# Patient Record
Sex: FEMALE | Race: WHITE | Employment: FULL TIME | URBAN - METROPOLITAN AREA
[De-identification: names, ages, dates, MRNs, and addresses within clinical notes are randomized per-mention and may not be internally consistent; named-entity substitution may affect disease eponyms.]

---

## 2017-03-24 ENCOUNTER — TELEPHONE (OUTPATIENT)
Dept: FAMILY MEDICINE | Facility: CLINIC | Age: 27
End: 2017-03-24

## 2017-03-24 NOTE — LETTER
[facility]  [address] [facility] [address]  [phone]          March 24, 2017    Marilu Gatica  [address] [facility] [address]    Marilu,      Our records indicate that you have not scheduled for a(n)annual female exam which was recommended by your health care team.     If you are receiving any of these services at another site please bring in a copy at your next visit so we can get it scanned into your chart.     Monitoring and managing your preventative and chronic health conditions are very important to us.     If you have received your health care elsewhere, please call the clinic so the information can be documented in your chart.    Please call [phone] or message us through your [place] account to schedule an appointment or provide information for your chart.     I look forward to seeing you and working with you on your health care needs.     Sincerely,       Your [facility]/rb              *If you have already scheduled an appointment, please disregard this reminder

## 2017-03-24 NOTE — TELEPHONE ENCOUNTER
Panel Management Review      Patient has the following on her problem list:     Depression / Dysthymia review  PHQ-9 SCORE 6/24/2016 8/4/2016 12/28/2016   Total Score - - -   Total Score 13 5 4      Patient is due for:  None      Composite cancer screening  Chart review shows that this patient is due/due soon for the following Pap Smear  Summary:    Patient is due/failing the following:   PAP    Action needed:   Patient needs office visit for physical.    Type of outreach:    Sent letter.    Questions for provider review:    None                                                                                                                                    Krista Jennings CMA       Chart routed to closed .

## 2019-02-28 ENCOUNTER — NURSE TRIAGE (OUTPATIENT)
Dept: NURSING | Facility: CLINIC | Age: 29
End: 2019-02-28

## 2019-02-28 NOTE — TELEPHONE ENCOUNTER
Patient reports shoveling driveway yesterday and fell on tailbone.  Patient is doing ice and heat and ibuprofen but says pain is worse today.  Pain level is 6/10 but can goe up to 7-8/10 when getting up after sitting for a while.  Reviewed guideline and care advice with caller to treat at home and call back with worsening symptoms.  Caller verbalizes understanding.        Additional Information    Negative: Dangerous mechanism of injury (e.g., fall > 10 feet or 3 meters, trampoline)(Exception: pain began > 1 hour after injury)    Negative: Sounds like a life-threatening emergency to the triager    Negative: Injury to back above tailbone    Negative: Wound looks infected    Negative: Can't walk or very difficult to walk    Negative: [1] Unable to urinate (or only a few drops) > 4 hours AND     [2] bladder feels very full (e.g., palpable bladder or strong urge to urinate)    Negative: [1] Urinary incontinence (i.e., loss of bladder control) AND [2] new onset    Negative: Numbness (loss of sensation) in groin or rectal area    Negative: Blood in stool    Negative: Blood in urine (red, pink, or tea-colored)    Negative: Weakness of a leg or foot (e.g., unable to bear weight, dragging foot)    Negative: Numbness in a leg or foot (i.e., loss of sensation)    Negative: [1] SEVERE pain AND [2] not improved 2 hours after pain medicine/ice packs    Negative: Pain radiates into the thigh or further down the leg now    Negative: [1] High-risk adult (e.g., age > 60, osteoporosis, chronic steroid use) AND [2] still hurts    Negative: [1] After 3 days (72 hours) AND [2] pain not improving    Negative: [1] After 4 weeks AND [2] still painful    Minor tailbone injury    Protocols used: TAILBONE INJURY-ADULT-

## 2019-09-29 ENCOUNTER — OFFICE VISIT (OUTPATIENT)
Dept: URGENT CARE | Facility: URGENT CARE | Age: 29
End: 2019-09-29
Payer: COMMERCIAL

## 2019-09-29 VITALS
TEMPERATURE: 98.4 F | BODY MASS INDEX: 44.69 KG/M2 | DIASTOLIC BLOOD PRESSURE: 87 MMHG | HEIGHT: 63 IN | OXYGEN SATURATION: 96 % | WEIGHT: 252.2 LBS | SYSTOLIC BLOOD PRESSURE: 143 MMHG | HEART RATE: 92 BPM

## 2019-09-29 DIAGNOSIS — R03.0 ELEVATED BP WITHOUT DIAGNOSIS OF HYPERTENSION: ICD-10-CM

## 2019-09-29 DIAGNOSIS — M54.50 ACUTE RIGHT-SIDED LOW BACK PAIN WITHOUT SCIATICA: Primary | ICD-10-CM

## 2019-09-29 PROCEDURE — 99204 OFFICE O/P NEW MOD 45 MIN: CPT | Performed by: INTERNAL MEDICINE

## 2019-09-29 RX ORDER — IBUPROFEN 800 MG/1
800 TABLET, FILM COATED ORAL EVERY 8 HOURS PRN
Qty: 50 TABLET | Refills: 0 | Status: SHIPPED | OUTPATIENT
Start: 2019-09-29 | End: 2020-02-18 | Stop reason: ALTCHOICE

## 2019-09-29 RX ORDER — CYCLOBENZAPRINE HCL 10 MG
10 TABLET ORAL 3 TIMES DAILY PRN
Qty: 30 TABLET | Refills: 0 | Status: SHIPPED | OUTPATIENT
Start: 2019-09-29 | End: 2020-02-18

## 2019-09-29 ASSESSMENT — MIFFLIN-ST. JEOR: SCORE: 1828.1

## 2019-09-29 NOTE — PROGRESS NOTES
SUBJECTIVE:  Marilu Villeda is an 29 year old female who presents for back pain.  Having pain in right low back.  Some radiation of pain into upper thigh.  Hurts more when goes between sitting and standing.  Has taken ibuprofen and used topical biofreeze which help a little.  sxs started two days ago.  Hasn't improved since onset, and has worsened some.  In past has had a couple falls which caused back issues for a short time, but overall no back issues.  No recent falls or trauma or accidents.  Normal urination.  No incontinence.  Normal BMs with no incontinence.  Feels most comfortable when lies down, but hurts if rolls over in bed.  Sleeping some but wakes up some from pain when moves in sleep.  Helps to have a pillow between legs.  No numbness or weakness.  No fevers, chills, sweats.    PMH:  Patient Active Problem List   Diagnosis     CARDIOVASCULAR SCREENING; LDL GOAL LESS THAN 160     Obesity, Class II, BMI 35-39.9     Depression with anxiety     Anxiety     Social History     Socioeconomic History     Marital status:      Spouse name: None     Number of children: None     Years of education: None     Highest education level: None   Occupational History     None   Social Needs     Financial resource strain: None     Food insecurity:     Worry: None     Inability: None     Transportation needs:     Medical: None     Non-medical: None   Tobacco Use     Smoking status: Never Smoker     Smokeless tobacco: Never Used   Substance and Sexual Activity     Alcohol use: Yes     Drug use: No     Sexual activity: Yes     Partners: Male   Lifestyle     Physical activity:     Days per week: None     Minutes per session: None     Stress: None   Relationships     Social connections:     Talks on phone: None     Gets together: None     Attends Congregation service: None     Active member of club or organization: None     Attends meetings of clubs or organizations: None     Relationship status: None     Intimate  "partner violence:     Fear of current or ex partner: None     Emotionally abused: None     Physically abused: None     Forced sexual activity: None   Other Topics Concern     Parent/sibling w/ CABG, MI or angioplasty before 65F 55M? No   Social History Narrative     None     FH: negative for bone diseases    ALLERGIES:  Patient has no known allergies.    Current Outpatient Medications   Medication     Ibuprofen (IBU PO)     No current facility-administered medications for this visit.          ROS:  ROS is done and is negative for general/constitutional, eye, ENT, Respiratory, cardiovascular, GI, , Skin, musculoskeletal except as noted elsewhere.  All other review of systems negative except as noted elsewhere.      OBJECTIVE:  BP (!) 143/87   Pulse 92   Temp 98.4  F (36.9  C) (Oral)   Ht 1.6 m (5' 3\")   Wt 114.4 kg (252 lb 3.2 oz)   SpO2 96%   BMI 44.68 kg/m    GENERAL APPEARANCE: Alert, in no acute distress  EYES: normal  NOSE:normal  OROPHARYNX:normal  NECK:No adenopathy,masses or thyromegaly  RESP: normal and clear to auscultation  CV:regular rate and rhythm and no murmurs, clicks, or gallops  ABDOMEN: Abdomen soft, non-tender. BS normal. No masses, organomegaly  SKIN: no ulcers, lesions or rash  BACK: mild diffuse tenderness of right lower back, no edema, no erythema, no bruising.  Mildly limited rom in all directions due to pain.  slr negative bilaterally.   NEURO: Strength 5/5 and symmetric in bilateral lower extremities.  DTRs 2+ and symmetric in bilateral lower extremities.  Sensation to light touch grossly intact in bilateral lower extremities.    RESULTS  .  No results found for this or any previous visit (from the past 48 hour(s)).    ASSESSMENT/PLAN:    ASSESSMENT / PLAN:  (M54.5) Acute right-sided low back pain without sciatica  (primary encounter diagnosis)  Comment: currently most c/w muscular etiology.  Currently not c/w vertebral fracture, vertebral abscess, cauda equina syndrome, or " significant neurologic involvement  Plan: cyclobenzaprine (FLEXERIL) 10 MG tablet,         ibuprofen (ADVIL/MOTRIN) 800 MG tablet, [facility],        HAND, AND CHIROPRACTIC REFERRAL        Reviewed medication instructions and side effects. Follow up if experiences side effects.. Advised that flexeril can make drowsy or altered and is not to drive, operate machinery or consume alcohol or street drugs when taking.  Referred to PT and if not improving over the next couple days, is to schedule with PT.   I reviewed supportive care including icing, rest of the area, and nsaids,  otc meds to use if needed, expected course, and signs of concern.  Follow up as needed or if she does not improve within 10 day(s) or if worsens in any way.  Reviewed red flag symptoms and is to go to the ER if experiences any of these.      (R03.0) Elevated BP without diagnosis of hypertension  Comment: bp a little elevated which is likely due to pain  Plan: Recheck BP in 1-2 weeks with a nurse visit, [place] pharmacy visit, or a visit to primary care doctor.    See [facility] for orders, medications, letters, patient instructions    Svetlana Rodarte M.D.

## 2019-09-29 NOTE — PATIENT INSTRUCTIONS
Schedule an appointment with physical therapy if you do not improve over the next two days.    Recheck BP in 1-2 weeks with a nurse visit, [place] pharmacy visit, or a visit to primary care doctor.

## 2020-02-18 ENCOUNTER — OFFICE VISIT (OUTPATIENT)
Dept: FAMILY MEDICINE | Facility: CLINIC | Age: 30
End: 2020-02-18
Payer: COMMERCIAL

## 2020-02-18 VITALS
DIASTOLIC BLOOD PRESSURE: 94 MMHG | HEART RATE: 83 BPM | HEIGHT: 63 IN | WEIGHT: 260 LBS | SYSTOLIC BLOOD PRESSURE: 137 MMHG | TEMPERATURE: 98.6 F | BODY MASS INDEX: 46.07 KG/M2 | OXYGEN SATURATION: 96 %

## 2020-02-18 DIAGNOSIS — M54.41 ACUTE RIGHT-SIDED LOW BACK PAIN WITH RIGHT-SIDED SCIATICA: Primary | ICD-10-CM

## 2020-02-18 PROCEDURE — 99214 OFFICE O/P EST MOD 30 MIN: CPT | Performed by: FAMILY MEDICINE

## 2020-02-18 RX ORDER — SULINDAC 200 MG/1
200 TABLET ORAL 2 TIMES DAILY
Qty: 60 TABLET | Refills: 1 | Status: SHIPPED | OUTPATIENT
Start: 2020-02-18 | End: 2020-04-23

## 2020-02-18 RX ORDER — CYCLOBENZAPRINE HCL 10 MG
5-10 TABLET ORAL 3 TIMES DAILY PRN
Qty: 40 TABLET | Refills: 0 | Status: SHIPPED | OUTPATIENT
Start: 2020-02-18

## 2020-02-18 RX ORDER — GABAPENTIN 300 MG/1
CAPSULE ORAL
Qty: 90 CAPSULE | Refills: 1 | Status: SHIPPED | OUTPATIENT
Start: 2020-02-18 | End: 2020-04-23

## 2020-02-18 ASSESSMENT — PAIN SCALES - GENERAL: PAINLEVEL: MODERATE PAIN (5)

## 2020-02-18 ASSESSMENT — MIFFLIN-ST. JEOR: SCORE: 1863.48

## 2020-02-18 NOTE — PROGRESS NOTES
"Subjective     Marilu Villeda is a 29 year old female who presents to clinic today for the following health issues:    HPI   Back Pain       Duration: 2/13        Specific cause: lifting    Description:   Location of pain: low back right  Character of pain: sharp  Pain radiation: radiates into the right buttocks and radiates into the left buttocks (tingles, no pain)  New numbness or weakness in legs, not attributed to pain:  no     Intensity: Currently 5/10, At its worst 10/10    Progression of symptoms: getting better, worse in the morning    History:   Pain interferes with job: YES  History of back problems: YES - see note from 9/29/19  Any previous MRI or X-rays: None  Sees a specialist for back pain:  No  Therapies tried without relief: Icy hot & IBU (500 mg, 4-5 times per day)    Alleviating factors:   Improved by: none      Precipitating factors:  Worsened by: Sitting & laying on side        Accompanying Signs & Symptoms:  Risk of Fracture:  None  Risk of Cauda Equina:  None  Risk of Infection:  None  Risk of Cancer:  None  Risk of Ankylosing Spondylitis:  Onset at age <35, male, AND morning back stiffness. no       She has been coughing nonstop because she was sick. She could feel her back getting sore because of her coughing. She bent down to  a laundry basket, and she felt \"like a spark of electricity almost.\" She took 2/14 off of work because she couldn't sit properly, and laying on her back was the only comfortable position.    Past medical, family, and social histories, medications, and allergies are reviewed and updated in [facility].    Review of Systems   ROS COMP: Constitutional, HEENT, cardiovascular, pulmonary, gi and gu systems are negative, except as otherwise noted.      This document serves as a record of the services and decisions personally performed and made by Dr. Jhaveri. It was created on his behalf by Whit Elliott, a trained medical scribe. The creation of this document is based " "the provider's statements to the medical scribe.  Whit Elliott,  4:46 PM     Objective    BP (!) 137/94   Pulse 83   Temp 98.6  F (37  C) (Oral)   Ht 1.6 m (5' 3\")   Wt 117.9 kg (260 lb)   SpO2 96%   BMI 46.06 kg/m    Body mass index is 46.06 kg/m .     Physical Exam   GENERAL: healthy, alert and no distress  EYES: Eyes grossly normal to inspection, PERRL, EOMI, sclerae white and conjunctivae normal  MS: no gross musculoskeletal defects noted, no edema. Tenderness in right paraspinous muscular area, approximately at the LS junction. A little bit of midline tenderness in the same area.  SKIN: no suspicious lesions or rashes to visible skin  NEURO: Normal strength and tone, sensory exam grossly normal, mentation intact, oriented times 3 and cranial nerves 2-12 intact, lower extremity DTRs symmetrical, gait normal including heel and toe walk  PSYCH: mentation appears normal, affect normal/bright        Assessment & Plan     (M54.41) Acute right-sided low back pain with right-sided sciatica  (primary encounter diagnosis)  Comment: possible lumbar radicular symptoms, but the back pain is the main issue  Plan: cyclobenzaprine 10 MG PO tablet, gabapentin         (NEURONTIN) 300 MG PO capsule, [facility] PT, HAND,         AND CHIROPRACTIC REFERRAL, sulindac 200 MG PO         tablet        Handout(s) provided. Discontinue ibuprofen (patient advised that her dose was too high anyway). Return in about 5 weeks (around 3/26/2020) for recheck if symptoms fail to resolve by then.          The information in this document, created by the medical scribe for me, accurately reflects the services I personally performed and the decisions made by me. I have reviewed and approved this document for accuracy prior to leaving the patient care area.  Edgar Jhaveri MD  "

## 2020-02-18 NOTE — PATIENT INSTRUCTIONS
At [facility], we strive to deliver an exceptional experience to you, every time we see you. If you receive a survey, please complete it as we do value your feedback.  If you have MyChart, you can expect to receive results automatically within 24 hours of their completion.  Your provider will send a note interpreting your results as well.   If you do not have MyChart, you should receive your results in about a week by mail.    Your care team:                            Family Medicine Internal Medicine   MD Homer De La Cruz MD Shantel Branch-Fleming, MD Katya Georgiev PA-C Megan Hill, APRN CNP    Lio Denney, MD Pediatrics   Handy Barnett, PAFAITH Caldwell, MD Ernestina Hoang APRN CNP   MD Arti Sales MD Deborah Mielke, MD Kim Thein, APRN CNP      Clinic hours: Monday - Thursday 7 am-7 pm; Fridays 7 am-5 pm.   Urgent care: Monday - Friday 11 am-9 pm; Saturday and Sunday 9 am-5 pm.  Pharmacy : Monday -Thursday 8 am-8 pm; Friday 8 am-6 pm; Saturday and Sunday 9 am-5 pm.     Clinic: [phone]   Pharmacy: [phone]        Patient Education     Possible Causes of Low Back or Leg Pain    BIG: The symptoms in your back or leg may be due to pressure on a nerve. This pressure may be caused by a damaged disk or by abnormal bone growth. Either way, you may feel pain, burning, tingling, or numbness. If you have pressure on a nerve that connects to the sciatic nerve, pain may shoot down your leg.    Pressure from the disk  Constant wear and tear can weaken a disk over time and cause back pain. The disk can then be damaged by a sudden movement or injury. If its soft center starts to bulge, the disk may press on a nerve. Or the outside of the disk may tear, and the soft center may squeeze through and pinch a nerve.    Pressure from bone  As a disk wears out, the vertebrae right above and below the disk start to touch. This can put  pressure on a nerve. Often, abnormal bone (called bone spurs) grows where the vertebrae rub against each other. This can cause the foramen or the spinal canal to narrow (called stenosis) and press against a nerve.  Date Last Reviewed: 3/1/2018    0791-2396 [employer]. [address] [place] [address]. All rights reserved. This information is not intended as a substitute for professional medical care. Always follow your healthcare professional's instructions.           Patient Education     Relieving Back Pain  Back pain is a common problem. You can strain back muscles by lifting too much weight or just by moving the wrong way. Back strain can be uncomfortable, even painful. And it can take weeks or months to improve. To help yourself feel better and prevent future back strains, try these tips.  Important: Don't give aspirin to children or teens without first discussing it with your child's healthcare provider.  Ice    Ice reduces muscle pain and swelling. It helps most during the first 24 to 48 hours after an injury.    Wrap an ice pack or a bag of frozen peas in a thin towel. Never put ice directly on your skin.    Place the ice where your back hurts the most.    Don t ice for more than 20 minutes at a time.    You can use ice several times a day.  Medicines  Over-the-counter pain relievers include acetaminophen and anti-inflammatory medicines, which includes aspirin, naproxen, or ibuprofen. They can help ease discomfort. Some also reduce swelling.    Tell your healthcare provider about any medicines you are already taking.    Take medicines only as directed.  Manipulation and massage  Having manipulation by an osteopathic doctor or chiropractor may be helpful. Getting a massage also may help.   Heat  After the first 48 hours, heat can relax sore muscles and improve blood flow.    Try a warm bath or shower. Or use a heating pad set on low. To prevent a burn, keep a cloth between you and the  heating pad.    Don t use a heating pad for more than 15 minutes at a time. Never sleep on a heating pad.  Date Last Reviewed: 6/1/2018 2000-2019 [employer]. [address] [place] [address], [place] [address]. All rights reserved. This information is not intended as a substitute for professional medical care. Always follow your healthcare professional's instructions.

## 2020-04-21 DIAGNOSIS — M54.41 ACUTE RIGHT-SIDED LOW BACK PAIN WITH RIGHT-SIDED SCIATICA: ICD-10-CM

## 2020-04-22 DIAGNOSIS — M54.41 ACUTE RIGHT-SIDED LOW BACK PAIN WITH RIGHT-SIDED SCIATICA: ICD-10-CM

## 2020-04-23 RX ORDER — GABAPENTIN 300 MG/1
300 CAPSULE ORAL 3 TIMES DAILY
Qty: 90 CAPSULE | Refills: 1 | Status: SHIPPED | OUTPATIENT
Start: 2020-04-23

## 2020-04-23 RX ORDER — SULINDAC 200 MG/1
200 TABLET ORAL 2 TIMES DAILY WITH MEALS
Qty: 60 TABLET | Refills: 1 | Status: SHIPPED | OUTPATIENT
Start: 2020-04-23

## 2020-04-23 NOTE — TELEPHONE ENCOUNTER
sulindac 200 MG PO tablet   Last Written Prescription Date:  2/18/20  Last Fill Quantity: 60,  # refills: 1   Last office visit: 2/18/2020 with prescribing provider:     Future Office Visit:        Routing refill request to provider for review/approval because:  Labs not current:  ALT, AST, CBC, Creatinine  BP above goal  Fails protocol    Martha Moreira RN  [facility]

## 2020-04-23 NOTE — TELEPHONE ENCOUNTER
Requested Prescriptions   Pending Prescriptions Disp Refills     gabapentin (NEURONTIN) 300 MG capsule [Pharmacy Med Name: GABAPENTIN 300MG CAPSULES] 90 capsule 1     Sig: TAKE 1 CAPSULE EVERY EVENING FOR 1- 3 DAYS, THEN 1 CAPSULE TWICE DAILY FOR 1- 3 DAYS, THEN 1 CAPSULE THREE TIMES DAILY FOR NERVE PAIN       There is no refill protocol information for this order        Routing refill request to provider for review/approval because:  Drug not on the [facility] [place]           Stephanie Vallecillo RN, BSN, PHN